# Patient Record
(demographics unavailable — no encounter records)

---

## 2024-10-10 NOTE — HISTORY OF PRESENT ILLNESS
[FreeTextEntry1] : Mr. ALYSON QURESHI is a 60-year male who presents today for followup. Previously, he has been followed for right side lung cancer s/p SBRT, wedge resection of KIMBERLY lesion fungal abscess, right side empyema with Eloesser flap, osteomyelitis of right right, chronic rib pain with chronic opioid dependence, COPD, emphysema with bullous upper lobe disease and a bronchopleural fistula who underwent endobronchial valve placement to RML, removal of 6th rib and Eloesser flap revision for persistent air leak and right empyema on 4/18/2023.  He is currently under Palliative Care and receiving Dilaudid PCA for his ongoing pain.  9/8/24 - 9/11/24: hospitalized at [facility]. Presented with worsening shortness of breath. In the ED. Placed on BiPAP, treated with nebulizers, magnesium, steroids, and empiric antibiotics. CXR without evidence of pneumonia. ABG with CO2 retention. Admitted hypoxic and hypercapnic respiratory failure in the setting of COPD exacerbation.

## 2024-10-14 NOTE — ASSESSMENT
[FreeTextEntry1] : Today a depression screening was completed (PHQ-2) and the patient screened positive. This does not diagnose depression but may detect signs of depression. The patient denies suicidal ideation. Brief discussion held with the patient regarding options for their next steps. This is not something we can diagnose or manage in our office, but a behavioral health referral was offered and he refused. The patient was strongly encouraged to follow up with primary care provider as soon as possible. The patient expressed understanding and compliance.  Jose is a 59-year-old male with an open chest and persistent air leak.  His wound is clean.  He was recently hospitalized with a COPD exacerbation and has improved.  I would like to see him in 1 month's time for follow-up.  Thank you for allowing me to participate in the care of your patient.  30 minutes was spent during this encounter.  Dylan Mcgregor MD Department of Cardiovascular and Thoracic Surgery  [employer] at [facility]

## 2024-10-14 NOTE — HISTORY OF PRESENT ILLNESS
Outpatient Therapy Discharge Summary     Name: Jayro Mattson  [facility] Number: [id number]    Therapy Diagnosis:   Encounter Diagnoses   Name Primary?    Balance problem     Weakness of both lower extremities      Physician: Han Monroy MD     Physician Orders: PT Eval and Treat   Medical Diagnosis from Referral: dizziness and giddiness  Evaluation Date: 8/31/2018      Date of Last visit: 09/27/2018  Total Visits Received: 8    Assessment    Goals:   Short Term Goals: 4 weeks   Pt will have less sway with standing to improve steadiness with standing and gait. MET  Pt to improve Murphy Balance score by 3 percentage points. MET  Pt able to balance single leg with eyes closed for 10 seconds. MET   Long Term Goals: 8 weeks   Pt able to balance single leg with eyes closed for 20 seconds MET  5/5 strength bilateral LEs for improved steadiness with standing. MET  Pt improves Murphy balance score by 5 percentage points.MET      Discharge reason: Patient has met all of his/her goals    Plan   This patient is discharged from Physical Therapy       [FreeTextEntry1] : Mr. ALYSON QURESHI is a 59-year male who presents today for followup. Previously, he has been followed for right side lung cancer s/p SBRT, wedge resection of KIMBERLY lesion fungal abscess, right side empyema with Eloesser flap, osteomyelitis of right right, chronic rib pain with chronic opioid dependence, COPD, emphysema with bullous upper lobe disease and a bronchopleural fistula who underwent endobronchial valve placement to RML, removal of 6th rib and Eloesser flap revision for persistent air leak and right empyema on 4/18/2023.  He is currently under Palliative Care and receiving Dilaudid PCA for his ongoing pain.   Today he reports that he is feeling "great."

## 2024-10-14 NOTE — REVIEW OF SYSTEMS
[Arthralgias] : arthralgias [As Noted in HPI] : as noted in HPI [Negative] : Musculoskeletal [Fever] : no fever [Chills] : no chills [Feeling Poorly] : not feeling poorly [Feeling Tired] : not feeling tired [Chest Pain] : no chest pain [Palpitations] : no palpitations [Lower Ext Edema] : no extremity edema [Shortness Of Breath] : no shortness of breath [Wheezing] : no wheezing [Cough] : no cough [SOB on Exertion] : no shortness of breath during exertion [FreeTextEntry2] : Left arm PICC

## 2024-10-14 NOTE — DATA REVIEWED
[FreeTextEntry1] : 8/23/24 CT chest performed through [facility] IMPRESSION: Postsurgical change to right chest wall, left upper lobe and right lung  base, with fibrotic change and scarring. Increased left apical nodular  opacity, up to 1.5 cm. Follow-up CT chest in 3 months recommended.

## 2024-10-14 NOTE — HISTORY OF PRESENT ILLNESS
[FreeTextEntry1] : Mr. ALYSON QURESHI is a 59-year male who presents today for followup. Previously, he has been followed for right side lung cancer s/p SBRT, wedge resection of KIMBERLY lesion fungal abscess, right side empyema with Eloesser flap, osteomyelitis of right right, chronic rib pain with chronic opioid dependence, COPD, emphysema with bullous upper lobe disease and a bronchopleural fistula who underwent endobronchial valve placement to RML, removal of 6th rib and Eloesser flap revision for persistent air leak and right empyema on 4/18/2023.  He is currently under Palliative Care and receiving Dilaudid PCA for his ongoing pain.   Today he reports that he is feeling "great."

## 2024-10-14 NOTE — DATA REVIEWED
[FreeTextEntry1] : 8/23/24 CT chest performed through [facility] Imaging IMPRESSION: Postsurgical change to right chest wall, left upper lobe and right lung  base, with fibrotic change and scarring. Increased left apical nodular  opacity, up to 1.5 cm. Follow-up CT chest in 3 months recommended.

## 2024-10-14 NOTE — ASSESSMENT
[FreeTextEntry1] : Today a depression screening was completed (PHQ-2) and the patient screened positive. This does not diagnose depression but may detect signs of depression. The patient denies suicidal ideation. Brief discussion held with the patient regarding options for their next steps. This is not something we can diagnose or manage in our office, but a behavioral health referral was offered and he refused. The patient was strongly encouraged to follow up with primary care provider as soon as possible. The patient expressed understanding and compliance.  Jose is a 59-year-old male with an open chest and persistent air leak.  His wound is clean.  He was recently hospitalized with a COPD exacerbation and has improved.  I would like to see him in 1 month's time for follow-up.  Thank you for allowing me to participate in the care of your patient.  30 minutes was spent during this encounter.  Dylan Mcgregor MD Department of Cardiovascular and Thoracic Surgery  [employer] at [facility]

## 2024-11-04 NOTE — DATA REVIEWED
[No studies available for review at this time.] : No studies available for review at this time. [FreeTextEntry1] : 8/23/24 CT chest performed through [facility] IMPRESSION: Postsurgical change to right chest wall, left upper lobe and right lung  base, with fibrotic change and scarring. Increased left apical nodular  opacity, up to 1.5 cm. Follow-up CT chest in 3 months recommended.

## 2024-11-04 NOTE — REVIEW OF SYSTEMS
[Negative] : Heme/Lymph [Fever] : no fever [Chills] : no chills [Feeling Poorly] : not feeling poorly [Feeling Tired] : not feeling tired [Chest Pain] : no chest pain [Palpitations] : no palpitations [Lower Ext Edema] : no extremity edema [As Noted in HPI] : as noted in HPI [Shortness Of Breath] : no shortness of breath [Wheezing] : no wheezing [Cough] : no cough [SOB on Exertion] : no shortness of breath during exertion [FreeTextEntry2] : Left arm PICC

## 2024-11-04 NOTE — ASSESSMENT
[FreeTextEntry1] : Today a depression screening was completed (PHQ-2) and the patient screened positive. This does not diagnose depression but may detect signs of depression. The patient denies suicidal ideation. Brief discussion held with the patient regarding options for their next steps. This is not something we can diagnose or manage in our office, but a behavioral health referral was offered and he refused. The patient was strongly encouraged to follow up with primary care provider as soon as possible. The patient expressed understanding and compliance.  Jose is a 59-year-old male with an open chest and persistent air leak.  His wound is clean.  I would like to see him in 1 month's time for follow-up.  Thank you for allowing me to participate in the care of your patient.  30 minutes was spent during this encounter.  Dylan Mcgregor MD Department of Cardiovascular and Thoracic Surgery  [employer] at [facility]

## 2024-11-04 NOTE — DATA REVIEWED
[No studies available for review at this time.] : No studies available for review at this time. [FreeTextEntry1] : 8/23/24 CT chest performed through [facility] Imaging IMPRESSION: Postsurgical change to right chest wall, left upper lobe and right lung  base, with fibrotic change and scarring. Increased left apical nodular  opacity, up to 1.5 cm. Follow-up CT chest in 3 months recommended.

## 2024-11-04 NOTE — ASSESSMENT
[FreeTextEntry1] : Today a depression screening was completed (PHQ-2) and the patient screened positive. This does not diagnose depression but may detect signs of depression. The patient denies suicidal ideation. Brief discussion held with the patient regarding options for their next steps. This is not something we can diagnose or manage in our office, but a behavioral health referral was offered and he refused. The patient was strongly encouraged to follow up with primary care provider as soon as possible. The patient expressed understanding and compliance.  Jose is a 59-year-old male with an open chest and persistent air leak.  His wound is clean.  I would like to see him in 1 month's time for follow-up.  Thank you for allowing me to participate in the care of your patient.  30 minutes was spent during this encounter.  Dylan Mcgregor MD Department of Cardiovascular and Thoracic Surgery  [employer] at [facility]

## 2024-12-09 NOTE — DATA REVIEWED
[No studies available for review at this time.] : No studies available for review at this time. [FreeTextEntry1] : 8/23/24 CT chest performed through [facility] IMPRESSION: Postsurgical change to right chest wall, left upper lobe and right lung  base, with fibrotic change and scarring. Increased left apical nodular  opacity, up to 1.5 cm. Follow-up CT chest in 3 months recommended.

## 2024-12-09 NOTE — REVIEW OF SYSTEMS
[Fever] : no fever [Chills] : no chills [Feeling Poorly] : not feeling poorly [Feeling Tired] : not feeling tired [Chest Pain] : no chest pain [Palpitations] : no palpitations [Lower Ext Edema] : no extremity edema [Shortness Of Breath] : no shortness of breath [Wheezing] : no wheezing [Cough] : no cough [SOB on Exertion] : no shortness of breath during exertion [As Noted in HPI] : as noted in HPI [Negative] : Heme/Lymph [FreeTextEntry2] : Left arm PICC

## 2024-12-09 NOTE — ASSESSMENT
[FreeTextEntry1] : Today a depression screening was completed (PHQ-2) and the patient screened positive. This does not diagnose depression but may detect signs of depression. The patient denies suicidal ideation. Brief discussion held with the patient regarding options for their next steps. This is not something we can diagnose or manage in our office, but a behavioral health referral was offered and he refused. The patient was strongly encouraged to follow up with primary care provider as soon as possible. The patient expressed understanding and compliance.  Jose is a 59-year-old male with an open chest and persistent air leak.  His wound is clean.  He does complain of more pain in the chest and shoulder today and will be seeing palliative in the near future. I would like to see him in 1 month's time for follow-up.  Thank you for allowing me to participate in the care of your patient.  30 minutes was spent during this encounter.  Dylan Mcgregor MD Department of Cardiovascular and Thoracic Surgery  [employer] at [facility]

## 2024-12-09 NOTE — DATA REVIEWED
[No studies available for review at this time.] : No studies available for review at this time. [FreeTextEntry1] : 8/23/24 CT chest performed through [facility] IMPRESSION: Postsurgical change to right chest wall, left upper lobe and right lung  base, with fibrotic change and scarring. Increased left apical nodular  opacity, up to 1.5 cm. Follow-up CT chest in 3 months recommended.

## 2024-12-09 NOTE — ASSESSMENT
[FreeTextEntry1] : Today a depression screening was completed (PHQ-2) and the patient screened positive. This does not diagnose depression but may detect signs of depression. The patient denies suicidal ideation. Brief discussion held with the patient regarding options for their next steps. This is not something we can diagnose or manage in our office, but a behavioral health referral was offered and he refused. The patient was strongly encouraged to follow up with primary care provider as soon as possible. The patient expressed understanding and compliance.  Jose is a 59-year-old male with an open chest and persistent air leak.  His wound is clean.  He does complain of more pain in the chest and shoulder today and will be seeing palliative in the near future. I would like to see him in 1 month's time for follow-up.  Thank you for allowing me to participate in the care of your patient.  30 minutes was spent during this encounter.  Dylan Mcgregor MD Department of Cardiovascular and Thoracic Surgery  [employer] at [facility]

## 2024-12-18 NOTE — HISTORY OF PRESENT ILLNESS
[FreeTextEntry1] : Pt is a 59 y/o M COPD with RUL NSCLC S/p CyberKnife 6000 cGy in 4 fractions to RUL 12/12/16 - 12/15/16 stage IB poorly differentiated carcinoma. He also had a KIMBERLY wedge resection, on at site of surgery, right side empyema with Eloesser flap, osteomyelitis s/p multiple IV Abx regimens, chronic rib pain with chronic opioid dependence, COPD, emphysema with bullous upper lobe disease and a bronchopleural fistula who underwent endobronchial valve placement to RML, removal of 6th rib and Eloesser flap revision for persistent air leak and right empyema on 4/18/2023, Aspergilloma s/p course of voriconazole comes for f/u for pain management.   Interval Events:  Admitted to [facility] 11/8-11/9 for RLL pneumonia on CPAP + received course of abx for chronic wound  1. Chronic pain - 6-7/10 on current morphine regimen = mod/ severe pain. Goal is 5/10  - Stable on dilaudid BT medication   2. Anxiety  - PCP started patient on Lexapro 10 mg which is making the patient a "zombie" - pt considering DC of Lexapro, however seems less anxious   3. COPD - pulm following -stable on inhalers  4. Debility  -60 %, able to walk dogs, go outside - can not lift, mainly sit/lie

## 2024-12-18 NOTE — REASON FOR VISIT
[Spouse] : spouse [Friend] : friend [Home] : at home, [unfilled] , at the time of the visit. [Medical Office: ([facility])___] : at the medical office located in  [Patient] : the patient

## 2024-12-18 NOTE — REVIEW OF SYSTEMS
[Fever] : no fever [Chills] : no chills [Shortness Of Breath] : no shortness of breath [Wheezing] : no wheezing [Cough] : no cough [SOB on Exertion] : shortness of breath during exertion [Abdominal Pain] : no abdominal pain [Constipation] : no constipation [Diarrhea] : no diarrhea [Dysuria] : no dysuria [Joint Stiffness] : joint stiffness [Confused] : no confusion [Dizziness] : no dizziness [Fainting] : no fainting [Anxiety] : anxiety [Depression] : no depression [Negative] : Cardiovascular

## 2024-12-18 NOTE — PHYSICAL EXAM
[General Appearance - Alert] : alert [Sclera] : the sclera and conjunctiva were normal [Extraocular Movements] : extraocular movements were intact [No Oral Pallor] : no oral pallor [] : no respiratory distress [Edema] : there was no peripheral edema [Involuntary Movements] : no involuntary movements were seen [FreeTextEntry1] : Missing ribs on palpation, abnl spinal curvature. [Impaired Insight] : insight and judgment were intact [Affect] : the affect was normal [Mood] : the mood was normal

## 2024-12-18 NOTE — END OF VISIT
[] : Fellow [FreeTextEntry3] : Agree with the [place] assessment and plan.  I have reviewed and edited the note above as needed.  Patient is a 60 y/o patient with chronic progressive pain.  Patient is reported bad taste and nausea w/ methadone soln- will change to po.  Will consider antidepressant to help with anxiety, however must avoid serotonin agents.  C/w medical marijuana for anxiety and xanax for panic attacks C/w medication regimen as outlined above Recc to make apt with pain management for consideration of spinal stimulator vs other alt modality  [Time Spent: ___ minutes] : I have spent [unfilled] minutes of time on the encounter which excludes teaching and separately reported services.

## 2024-12-19 NOTE — RESULTS/DATA
[TextEntry] : 12/19/2024: Spirometry demonstrates a forced vital capacity of 2.84 L (71%) and an FEV1 of 1.15 L (37%).  Compared to previous studies of February 2024 there is been improvement in forced vital capacity from 2.63 L and FEV1 from 1.03 L

## 2024-12-19 NOTE — CONSULT LETTER
[Dear  ___] : Dear  [unfilled], [Consult Letter:] : I had the pleasure of evaluating your patient, [unfilled]. [Please see my note below.] : Please see my note below. [Consult Closing:] : Thank you very much for allowing me to participate in the care of this patient.  If you have any questions, please do not hesitate to contact me. [Sincerely,] : Sincerely, [FreeTextEntry3] : Philip Otoole MD FCCP Pulmonary/Critical Care/Sleep Medicine Department of Internal Medicine  [facility]

## 2024-12-19 NOTE — HISTORY OF PRESENT ILLNESS
[TextBox_4] : 60-year-old male with a history of stage IV chronic obstructive lung disease status post SBRT with wedge resection of left upper lobe mass (fungal abscess), right sided empyema, Ellisor flap with revision, bronchopleural fistula, right middle lobe lobectomy with removal of the 6 rib and chronic pain syndrome with chronic opioid dependency, chronic hypercapnic respiratory failure on noninvasive ventilation seen today in follow-up ([facility] surgical).  Patient doing well with continued complaint of right-sided chest pain.  Recent CAT scan was performed.  No increased complaints of cough wheeze or sputum production

## 2024-12-19 NOTE — PHYSICAL EXAM
[No Acute Distress] : no acute distress [Normal Oropharynx] : normal oropharynx [Normal Appearance] : normal appearance [No Neck Mass] : no neck mass [Normal Rate/Rhythm] : normal rate/rhythm [Normal S1, S2] : normal s1, s2 [No Murmurs] : no murmurs [No Resp Distress] : no resp distress [Clear to Auscultation Bilaterally] : clear to auscultation bilaterally [Benign] : benign [Normal Gait] : normal gait [No Clubbing] : no clubbing [No Cyanosis] : no cyanosis [No Edema] : no edema [FROM] : FROM [Normal Color/ Pigmentation] : normal color/ pigmentation [No Focal Deficits] : no focal deficits [Oriented x3] : oriented x3 [Normal Affect] : normal affect [TextBox_2] : Thin cachectic male [TextBox_80] : Left posterior chest wall defect with minimal air on exhalation

## 2024-12-19 NOTE — END OF VISIT
[Time Spent: ___ minutes] : I have spent [unfilled] minutes of time on the encounter which excludes teaching and separately reported services. [FreeTextEntry3] : Hospitalization review, PACS review

## 2024-12-19 NOTE — DISCUSSION/SUMMARY
[FreeTextEntry1] : 60-year-old male with a history of stage IV chronic obstructive lung disease complicated by the above seen today in follow-up.  Presently the patient is COPD appears to be at his best.  He is doing clinically well with his noninvasive ventilator.  He has been advised to continue therapy.  A follow-up CAT scan will be performed in 3 months secondary to a relatively new left lower lobe nodule

## 2025-01-04 NOTE — HISTORY OF PRESENT ILLNESS
[FreeTextEntry1] : 61 YO MALE PMH OF R SIDED LUNG CA  S/P RADIATION THERAPY WEDGE RESECTION OF KIMBERLY LESION? FUNGAL ABSCESS ELOESSER FLAP  OSTEOMYELITS OF RIGHT RIB WITH CHRINIC OPOID DEPENDECE PT WAS RECENTLY HOSPITALIZED AT [facility] FOR INCREASED  SOB ADN PT HAD POSTIVE FLU SWAB AND WAS DISCHARGED  ON  TAMIFUL HERE FOR FOLLUWP    FEELS WEAK

## 2025-01-04 NOTE — ASSESSMENT
[FreeTextEntry1] : 61 YO MALE PMH OF R SIDED LUNG CA  S/P RADIATION THERAPY WEDGE RESECTION OF KIMBERLY LESION? FUNGAL ABSCESS ELOESSER FLAP  OSTEOMYELITS OF RIGHT RIB WITH CHRINIC OPOID DEPENDECE PT WAS RECENTLY HOSPITALIZED AT [facility] FOR INCREASED  SOB ADN PT HAD POSTIVE FLU SWAB AND WAS DISCHARGED  ON  TAMIFUL HERE FOR FOLLUWP    FEELS WEAK  BUT BETTER THAN IN THE HOSPITAL JEFFRY PLAN TO COMPLETED THE TAMIFUL  FOLLOWP WITH PULM PT WITH  LARGE BADAGE ON UPPER BACK   DID NOT REMOVE AS NEED TO BE PACKED  FOLLOWUP WITH CT SURGERY PT IS SCHEDULED FOR REPAT CT SCAN OF CHEST  WILL JULISSA PULM AFTER SCAN  REVIEWD HOSP RECORDS FORM RECENT HSOPITAL STAY WITH INFLUENZA

## 2025-01-04 NOTE — HISTORY OF PRESENT ILLNESS
[FreeTextEntry1] : 59 YO MALE PMH OF R SIDED LUNG CA  S/P RADIATION THERAPY WEDGE RESECTION OF KIMBERLY LESION? FUNGAL ABSCESS ELOESSER FLAP  OSTEOMYELITS OF RIGHT RIB WITH CHRINIC OPOID DEPENDECE PT WAS RECENTLY HOSPITALIZED AT [facility] FOR INCREASED  SOB ADN PT HAD POSTIVE FLU SWAB AND WAS DISCHARGED  ON  TAMIFUL HERE FOR FOLLUWP    FEELS WEAK

## 2025-01-04 NOTE — ASSESSMENT
[FreeTextEntry1] : 59 YO MALE PMH OF R SIDED LUNG CA  S/P RADIATION THERAPY WEDGE RESECTION OF KIMBERLY LESION? FUNGAL ABSCESS ELOESSER FLAP  OSTEOMYELITS OF RIGHT RIB WITH CHRINIC OPOID DEPENDECE PT WAS RECENTLY HOSPITALIZED AT [facility] FOR INCREASED  SOB ADN PT HAD POSTIVE FLU SWAB AND WAS DISCHARGED  ON  TAMIFUL HERE FOR FOLLUWP    FEELS WEAK  BUT BETTER THAN IN THE HOSPITAL JEFFRY PLAN TO COMPLETED THE TAMIFUL  FOLLOWP WITH PULM PT WITH  LARGE BADAGE ON UPPER BACK   DID NOT REMOVE AS NEED TO BE PACKED  FOLLOWUP WITH CT SURGERY PT IS SCHEDULED FOR REPAT CT SCAN OF CHEST  WILL JULISSA PULM AFTER SCAN  REVIEWD HOSP RECORDS FORM RECENT HSOPITAL STAY WITH INFLUENZA

## 2025-01-13 NOTE — ASSESSMENT
[FreeTextEntry1] : Jose is a 60-year-old male with a thoracic window and a persistent air leak who was recently hospitalized with the flu.  He states he still has fatigue but is improving on a daily basis.  I would like to see him in 1 month's time.  Thank you for allowing me to participate in the care of your patient.  30 minutes was spent during this encounter.  Dylan Mcgregor MD Department of Cardiovascular and Thoracic Surgery  [employer] at [facility]

## 2025-01-13 NOTE — ASSESSMENT
[FreeTextEntry1] : Jose is a 60-year-old male with a thoracic window and a persistent air leak who was recently hospitalized with the flu.  He states he still has fatigue but is improving on a daily basis.  I would like to see him in 1 month's time.  Thank you for allowing me to participate in the care of your patient.  30 minutes was spent during this encounter.  Dylan Mcgregor MD Department of Cardiovascular and Thoracic Surgery  [employer] at [facility]

## 2025-01-13 NOTE — DATA REVIEWED
[FreeTextEntry1] : Independent review of imaging and independent interpretation was performed at today's visit. -11/25/24 CT Chest non contrast through [facility]

## 2025-01-13 NOTE — HISTORY OF PRESENT ILLNESS
[FreeTextEntry1] : Mr. ALYSON QURESHI is a 60-year-old male who presents today for followup. Previously, he has been followed for right side lung cancer s/p SBRT, wedge resection of KIMBERLY lesion fungal abscess, right side empyema with Eloesser flap, osteomyelitis of right right, chronic rib pain with chronic opioid dependence, COPD, emphysema with bullous upper lobe disease and a bronchopleural fistula who underwent endobronchial valve placement to RML, removal of 6th rib and Eloesser flap revision for persistent air leak and right empyema on 4/18/2023. He presents today for clinical follow up.   He continues to be under Palliative Care and receiving Dilaudid PCA for his ongoing pain.  He is being followed by Pulmonology. Dr. Otoole.   11/25/24 CT Chest non contrast through [facility] -Indeterminate left apical 0.6 x 1 cm nodular opacity is unchanged compared to 8/23/2024 CT chest but slightly progressed compared to 10/14/2023. -Indeterminate left lower lobe 1 cm nodular consolidative opacity is unchanged compared to 8/23/2024 but appears more dense compared to 10/14/2023.  Today he reports having recently been admitted for Influenza A with sepsis and hypoxemia. He was discharged 1/1/2025. He currently feels  aosft 7/10 pain in the chest. He feels beaten from the flu. He is feeling a little better

## 2025-01-13 NOTE — DATA REVIEWED
[FreeTextEntry1] : Independent review of imaging and independent interpretation was performed at today's visit. -11/25/24 CT Chest non contrast through [facility]

## 2025-01-13 NOTE — HISTORY OF PRESENT ILLNESS
[FreeTextEntry1] : Mr. ALYSON QURESHI is a 60-year-old male who presents today for followup. Previously, he has been followed for right side lung cancer s/p SBRT, wedge resection of KIMBERLY lesion fungal abscess, right side empyema with Eloesser flap, osteomyelitis of right right, chronic rib pain with chronic opioid dependence, COPD, emphysema with bullous upper lobe disease and a bronchopleural fistula who underwent endobronchial valve placement to RML, removal of 6th rib and Eloesser flap revision for persistent air leak and right empyema on 4/18/2023. He presents today for clinical follow up.   He continues to be under Palliative Care and receiving Dilaudid PCA for his ongoing pain.  He is being followed by Pulmonology. Dr. Otoole.   11/25/24 CT Chest non contrast through [facility] -Indeterminate left apical 0.6 x 1 cm nodular opacity is unchanged compared to 8/23/2024 CT chest but slightly progressed compared to 10/14/2023. -Indeterminate left lower lobe 1 cm nodular consolidative opacity is unchanged compared to 8/23/2024 but appears more dense compared to 10/14/2023.  Today he reports having recently been admitted for Influenza A with sepsis and hypoxemia. He was discharged 1/1/2025. He currently feels  aosft 7/10 pain in the chest. He feels beaten from the flu. He is feeling a little better

## 2025-01-24 NOTE — REASON FOR VISIT
[Family Member] : family member [Home] : at home, [unfilled] , at the time of the visit. [Medical Office: ([facility])___] : at the medical office located in  [Spouse] : spouse [Patient] : the patient [FreeTextEntry4] : Wife Terrie

## 2025-01-24 NOTE — REASON FOR VISIT
[Family Member] : family member [Home] : at home, [unfilled] , at the time of the visit. [Medical Office: ([facility])___] : at the medical office located in  [Spouse] : spouse [Patient] : the patient [FreeTextEntry4] : Wife Terrie

## 2025-01-24 NOTE — HISTORY OF PRESENT ILLNESS
[FreeTextEntry1] : Patient seen via telemedicine today, video and audio. Patient accompanied by wife Terrie Pt is a 59 y/o M COPD with RUL NSCLC S/p CyberKnife 6000 cGy in 4 fractions to RUL 12/12/16 - 12/15/16 stage IB poorly differentiated carcinoma. He also had a KIMBERLY wedge resection, on at site of surgery, right side empyema with Eloesser flap, osteomyelitis s/p multiple IV Abx regimens, chronic rib pain with chronic opioid dependence, COPD, emphysema with bullous upper lobe disease and a bronchopleural fistula who underwent endobronchial valve placement to RML, removal of 6th rib and Eloesser flap revision for persistent air leak and right empyema on 4/18/2023, Aspergilloma s/p course of voriconazole comes for f/u for pain management.   1. Chronic pain- worsening, evolving, concern for tolerance Dilaudid PCA via PICC line: CR 10mg/hr, DD 10mg with LO 15minutes Pt was given news yesterday that he would not be receiving anymore Dilaudid IV medication at home because of Dilaudid shortage. Patient reports that he has not been using his demand doses in order to conserve medication to try to make it last. However he has been having untolerable pain, 10/10, located in R chest and wound on back, sharp in nature. He reports that he now sees how much the demand doses have helped him, would make the pain tolerable at 6/10. Last bowel movement was today 1/15/25.

## 2025-01-24 NOTE — REVIEW OF SYSTEMS
[SOB on Exertion] : shortness of breath during exertion [Joint Stiffness] : joint stiffness [Anxiety] : anxiety [Negative] : Cardiovascular [Fever] : no fever [Chills] : no chills [Shortness Of Breath] : no shortness of breath [Wheezing] : no wheezing [Cough] : no cough [Abdominal Pain] : no abdominal pain [Constipation] : no constipation [Diarrhea] : no diarrhea [Dysuria] : no dysuria [Confused] : no confusion [Dizziness] : no dizziness [Fainting] : no fainting [Depression] : no depression

## 2025-01-24 NOTE — END OF VISIT
[Time Spent: ___ minutes] : I have spent [unfilled] minutes of time on the encounter which excludes teaching and separately reported services. [] : Fellow [FreeTextEntry3] : Signout to be given to inpatient pal team

## 2025-01-24 NOTE — HISTORY OF PRESENT ILLNESS
[FreeTextEntry1] : Patient seen via telemedicine today, video and audio. Patient accompanied by wife Terrie Pt is a 61 y/o M COPD with RUL NSCLC S/p CyberKnife 6000 cGy in 4 fractions to RUL 12/12/16 - 12/15/16 stage IB poorly differentiated carcinoma. He also had a KIMBERLY wedge resection, on at site of surgery, right side empyema with Eloesser flap, osteomyelitis s/p multiple IV Abx regimens, chronic rib pain with chronic opioid dependence, COPD, emphysema with bullous upper lobe disease and a bronchopleural fistula who underwent endobronchial valve placement to RML, removal of 6th rib and Eloesser flap revision for persistent air leak and right empyema on 4/18/2023, Aspergilloma s/p course of voriconazole comes for f/u for pain management.   1. Chronic pain- worsening, evolving, concern for tolerance Dilaudid PCA via PICC line: CR 10mg/hr, DD 10mg with LO 15minutes Pt was given news yesterday that he would not be receiving anymore Dilaudid IV medication at home because of Dilaudid shortage. Patient reports that he has not been using his demand doses in order to conserve medication to try to make it last. However he has been having untolerable pain, 10/10, located in R chest and wound on back, sharp in nature. He reports that he now sees how much the demand doses have helped him, would make the pain tolerable at 6/10. Last bowel movement was today 1/15/25.

## 2025-02-05 NOTE — PHYSICAL EXAM
[General Appearance - Alert] : alert [General Appearance - In No Acute Distress] : in no acute distress [Sclera] : the sclera and conjunctiva were normal [Extraocular Movements] : extraocular movements were intact [No Oral Pallor] : no oral pallor [Neck Appearance] : the appearance of the neck was normal [Exaggerated Use Of Accessory Muscles For Inspiration] : no accessory muscle use [Heart Sounds] : normal S1 and S2 [Edema] : there was no peripheral edema [Bowel Sounds] : normal bowel sounds [Involuntary Movements] : no involuntary movements were seen [] : no rash [No Focal Deficits] : no focal deficits [Affect] : the affect was normal [Mood] : the mood was normal [FreeTextEntry1] : Missing ribs on palpation, abnl spinal curvature.

## 2025-02-05 NOTE — REVIEW OF SYSTEMS
[SOB on Exertion] : shortness of breath during exertion [Joint Stiffness] : joint stiffness [Anxiety] : anxiety [Skin Wound] : skin wound [As Noted in HPI] : as noted in HPI [Negative] : Heme/Lymph [Fever] : no fever [Chills] : no chills [Shortness Of Breath] : no shortness of breath [Wheezing] : no wheezing [Cough] : no cough [Abdominal Pain] : no abdominal pain [Constipation] : no constipation [Diarrhea] : no diarrhea [Dysuria] : no dysuria [Confused] : no confusion [Dizziness] : no dizziness [Fainting] : no fainting [Depression] : no depression

## 2025-02-05 NOTE — REASON FOR VISIT
[Family Member] : family member [Home] : at home, [unfilled] , at the time of the visit. [Medical Office: ([facility])___] : at the medical office located in  [Spouse] : spouse [Patient] : the patient [FreeTextEntry4] : Wife Terrie

## 2025-02-05 NOTE — HISTORY OF PRESENT ILLNESS
[FreeTextEntry1] : 61 y/o M COPD with RUL NSCLC S/p CyberKnife 6000 cGy in 4 fractions to RUL 12/12/16 - 12/15/16 stage IB poorly differentiated carcinoma. He also had a KIMBERLY wedge resection, on at site of surgery, right side empyema with Eloesser flap, osteomyelitis s/p multiple IV Abx regimens, chronic rib pain with chronic opioid dependence, COPD, emphysema with bullous upper lobe disease and a bronchopleural fistula who underwent endobronchial valve placement to RML, removal of 6th rib and Eloesser flap revision for persistent air leak and right empyema on 4/18/2023, Aspergilloma s/p course of voriconazole comes for f/u for pain management.   Post Hospitalization Discharge from [facility] 1/22. Post Hospitalization Day 15 for Opioid rotation to fentanyl PCA secondary to Dilaudid shortage. Accompanied by wife Terrie  # Chronic pain syndrome - Controlled History of involving pain as well as tolerance/opioid dependence complicated by existential pain Patient has failed sertraline and lexapro for treatment of depression and anxiety Though fentanyl can wear off quicker, patient is overall stable He was discharged on fentanyl gtt at 7 mcg/kg/h Demand dose 450 mcg, lockout 30 minutes, clinician rescue bolus was 600 mcg in 4-hour limit:'s 3600 mcg  #Anxiety On Xanax 1 mg 3 times dailya s needed  # Advanced COPD Has oxygen concentrator as needed Pulmonary following closely for inhaler optimization Recently recovered from influenza  #Debility 60%, able to walk dogs, goes outside for doctors appointments and shopping with wife's assistance.  Cannot lift, mainly sit/lie

## 2025-02-07 NOTE — REASON FOR VISIT
[Family Member] : family member [Home] : at home, [unfilled] , at the time of the visit. [Medical Office: ([facility])___] : at the medical office located in  [Spouse] : spouse [Patient] : the patient [FreeTextEntry4] : Wife Terrie

## 2025-02-07 NOTE — REVIEW OF SYSTEMS
Possible Home/Possible Skilled Nursing Facilty (SNF) [SOB on Exertion] : shortness of breath during exertion [Joint Stiffness] : joint stiffness [Anxiety] : anxiety [Negative] : Cardiovascular [Fever] : no fever [Chills] : no chills [Shortness Of Breath] : no shortness of breath [Wheezing] : no wheezing [Cough] : no cough [Abdominal Pain] : no abdominal pain [Constipation] : no constipation [Diarrhea] : no diarrhea [Dysuria] : no dysuria [Confused] : no confusion [Dizziness] : no dizziness [Fainting] : no fainting [Depression] : no depression

## 2025-02-07 NOTE — REASON FOR VISIT
[Family Member] : family member [Home] : at home, [unfilled] , at the time of the visit. [Medical Office: ([facility])___] : at the medical office located in  [Spouse] : spouse [Patient] : the patient [FreeTextEntry4] : Wife Terrie

## 2025-02-07 NOTE — REASON FOR VISIT
[Family Member] : family member [Home] : at home, [unfilled] , at the time of the visit. [Medical Office: ([facility])___] : at the medical office located in  [Spouse] : spouse [Patient] : the patient [FreeTextEntry4] : Wife Terrie

## 2025-02-07 NOTE — END OF VISIT
[] : Fellow [Time Spent: ___ minutes] : I have spent [unfilled] minutes of time on the encounter which excludes teaching and separately reported services. [FreeTextEntry3] : Signout to be given to inpatient pal team

## 2025-02-07 NOTE — REASON FOR VISIT
[Family Member] : family member [Home] : at home, [unfilled] , at the time of the visit. [Medical Office: ([facility])___] : at the medical office located in  [Spouse] : spouse [Patient] : the patient [FreeTextEntry4] : Wife Terrie

## 2025-02-10 NOTE — ASSESSMENT
[FreeTextEntry1] : Jose is a 60-year-old male with a thoracic window and a persistent air leak.  He states he still has fatigue but is improving on a daily basis.  I would like to see him in 1 month's time.  Thank you for allowing me to participate in the care of your patient.  30 minutes was spent during this encounter.  Dylan Mcgregor MD Department of Cardiovascular and Thoracic Surgery  [employer] at [facility]

## 2025-02-10 NOTE — HISTORY OF PRESENT ILLNESS
[FreeTextEntry1] : Mr. ALYSON QURESHI is a 60-year-old male who presents today for followup. Previously, he has been followed for right side lung cancer s/p SBRT, wedge resection of KIMBERLY lesion fungal abscess, right side empyema with Eloesser flap, osteomyelitis of right right, chronic rib pain with chronic opioid dependence, COPD, emphysema with bullous upper lobe disease and a bronchopleural fistula who underwent endobronchial valve placement to RML, removal of 6th rib and Eloesser flap revision for persistent air leak and right empyema on 4/18/2023. He presents today for clinical follow up.   He continues to be under Palliative Care and receiving Dilaudid PCA for his ongoing pain.  He is being followed by Pulmonology. Dr. Otoole.   11/25/24 CT Chest non contrast through [facility] -Indeterminate left apical 0.6 x 1 cm nodular opacity is unchanged compared to 8/23/2024 CT chest but slightly progressed compared to 10/14/2023. -Indeterminate left lower lobe 1 cm nodular consolidative opacity is unchanged compared to 8/23/2024 but appears more dense compared to 10/14/2023.  Today he reports having recently been admitted for Influenza A with sepsis and hypoxemia. He was discharged 1/1/2025. He currently feels  aosft 7/10 pain in the chest. He feels beaten from the flu. He is feeling a little better

## 2025-02-10 NOTE — ASSESSMENT
[FreeTextEntry1] : Jose is a 60-year-old male with a thoracic window and a persistent air leak.  He states he still has fatigue but is improving on a daily basis.  I would like to see him in 1 month's time.  Thank you for allowing me to participate in the care of your patient.  30 minutes was spent during this encounter.  Dylan Mcgregor MD Department of Cardiovascular and Thoracic Surgery  [employer] at [facility]

## 2025-02-10 NOTE — DATA REVIEWED
[FreeTextEntry1] : Independent review of imaging and independent interpretation was performed at today's visit. -11/25/24 CT Chest non contrast through [facility]

## 2025-02-10 NOTE — HISTORY OF PRESENT ILLNESS
[FreeTextEntry1] : Mr. ALYSON QURESHI is a 60-year-old male who presents today for followup. Previously, he has been followed for right side lung cancer s/p SBRT, wedge resection of KIMBERLY lesion fungal abscess, right side empyema with Eloesser flap, osteomyelitis of right right, chronic rib pain with chronic opioid dependence, COPD, emphysema with bullous upper lobe disease and a bronchopleural fistula who underwent endobronchial valve placement to RML, removal of 6th rib and Eloesser flap revision for persistent air leak and right empyema on 4/18/2023. He presents today for clinical follow up.   He continues to be under Palliative Care and receiving Dilaudid PCA for his ongoing pain.  He is being followed by Pulmonology. Dr. Otoole.   11/25/24 CT Chest non contrast through [facility] -Indeterminate left apical 0.6 x 1 cm nodular opacity is unchanged compared to 8/23/2024 CT chest but slightly progressed compared to 10/14/2023. -Indeterminate left lower lobe 1 cm nodular consolidative opacity is unchanged compared to 8/23/2024 but appears more dense compared to 10/14/2023.  Today he reports having recently been admitted for Influenza A with sepsis and hypoxemia. He was discharged 1/1/2025. He currently feels  aosft 7/10 pain in the chest. He feels beaten from the flu. He is feeling a little better

## 2025-02-18 NOTE — HISTORY OF PRESENT ILLNESS
[Former] : former [TextBox_4] : 60-year-old male with a history of stage IV chronic obstructive lung disease status post SBRT with wedge resection of left upper lobe mass (fungal abscess), right sided empyema, Ellisor flap with revision, bronchopleural fistula, right middle lobe lobectomy with removal of the 6 rib and chronic pain syndrome with chronic opioid dependency, chronic hypercapnic respiratory failure on noninvasive ventilation seen today in follow-up ([facility] surgical).  Patient doing well with continued complaint of right-sided chest pain.  Recent CAT scan was performed.  No increased complaints of cough wheeze or sputum production  Course recently complicated by influenza A requiring brief hospitalization.  Patient feels back to baseline.

## 2025-02-18 NOTE — DISCUSSION/SUMMARY
[FreeTextEntry1] : 60-year-old male with history of stage IV chronic obstructive lung disease with hypercapnic respiratory failure maintained on home noninvasive ventilator.  Recent inflammatory changes on CAT scan are new.  Follow-up CT in 3 months from the date of this last CT.  Patient is also advised to consider pulmonary rehab and will undergo cardiac clearance prior to it.

## 2025-02-18 NOTE — REASON FOR VISIT
[Follow-Up] : a follow-up visit [Lung Cancer] : lung cancer [COPD] : COPD [Spouse] : spouse [TextBox_44] : respiratory failure

## 2025-02-18 NOTE — PHYSICAL EXAM
[No Acute Distress] : no acute distress [Normal Oropharynx] : normal oropharynx [Normal Appearance] : normal appearance [No Neck Mass] : no neck mass [Normal Rate/Rhythm] : normal rate/rhythm [Normal S1, S2] : normal s1, s2 [No Murmurs] : no murmurs [No Resp Distress] : no resp distress [Clear to Auscultation Bilaterally] : clear to auscultation bilaterally [Benign] : benign [Normal Gait] : normal gait [No Clubbing] : no clubbing [No Cyanosis] : no cyanosis [No Edema] : no edema [FROM] : FROM [Normal Color/ Pigmentation] : normal color/ pigmentation [No Focal Deficits] : no focal deficits [Oriented x3] : oriented x3 [Normal Affect] : normal affect [TextBox_2] : Thin cachectic male [TextBox_80] :  posterior chest wall defect with minimal air on exhalation

## 2025-02-18 NOTE — CONSULT LETTER
[Dear  ___] : Dear  [unfilled], [Consult Letter:] : I had the pleasure of evaluating your patient, [unfilled]. [Please see my note below.] : Please see my note below. [Consult Closing:] : Thank you very much for allowing me to participate in the care of this patient.  If you have any questions, please do not hesitate to contact me. [Sincerely,] : Sincerely, [FreeTextEntry3] : Philip Otoole MD FCCP Pulmonary/Critical Care/Sleep Medicine Department of Internal Medicine  [facility]

## 2025-03-10 NOTE — HISTORY OF PRESENT ILLNESS
[FreeTextEntry1] : Mr. ALYSON QURESHI is a 60-year-old male who presents today for followup. Previously, he has been followed for right side lung cancer s/p SBRT, wedge resection of KIMBERLY lesion fungal abscess, right side empyema with Eloesser flap, osteomyelitis of right right, chronic rib pain with chronic opioid dependence, COPD, emphysema with bullous upper lobe disease and a bronchopleural fistula who underwent endobronchial valve placement to RML, removal of 6th rib and Eloesser flap revision for persistent air leak and right empyema on 4/18/2023. He presents today for clinical follow up.   He continues to be under Palliative Care and receiving Dilaudid PCA for his ongoing pain.  He is being followed by Pulmonology. Dr. Otoole.   11/25/24 CT Chest non contrast through [facility] -Indeterminate left apical 0.6 x 1 cm nodular opacity is unchanged compared to 8/23/2024 CT chest but slightly progressed compared to 10/14/2023. -Indeterminate left lower lobe 1 cm nodular consolidative opacity is unchanged compared to 8/23/2024 but appears more dense compared to 10/14/2023.  Today he reports having recently been admitted for Influenza A with sepsis and hypoxemia. He was discharged 1/1/2025. He currently feels  aosft 7/10 pain in the chest. He feels beaten from the flu. He is feeling a little better

## 2025-03-10 NOTE — REVIEW OF SYSTEMS
[de-identified] : per mom pt not feeling well x 2 weeks nasal congestion headache no fever vomited thursday none since, no loose stool or diarrhea  [FreeTextEntry6] : no fever\par minimal cough\par appetite has been normal\par  [Feeling Poorly] : feeling poorly [Feeling Tired] : feeling tired [As Noted in HPI] : as noted in HPI [Negative] : Heme/Lymph [Chest Pain] : chest pain

## 2025-03-10 NOTE — DATA REVIEWED
[FreeTextEntry1] : Independent review of imaging and independent interpretation was performed at today's visit. -11/25/24 CT Chest non contrast through [facility]

## 2025-03-10 NOTE — DATA REVIEWED
[FreeTextEntry1] : Independent review of imaging and independent interpretation was performed at today's visit. -11/25/24 CT Chest non contrast through [facility]

## 2025-03-10 NOTE — REVIEW OF SYSTEMS
[Feeling Poorly] : feeling poorly [Feeling Tired] : feeling tired [As Noted in HPI] : as noted in HPI [Negative] : Heme/Lymph [Chest Pain] : chest pain

## 2025-03-10 NOTE — ASSESSMENT
[FreeTextEntry1] : Jose is a 60-year-old male with a thoracic window and a persistent air leak.  He states he recently had pneumonia but is improving on a daily basis.  I would like to see him in 1 month's time.  Thank you for allowing me to participate in the care of your patient.  30 minutes was spent during this encounter.  Dylan Mcgregor MD Department of Cardiovascular and Thoracic Surgery  [employer] at [facility]

## 2025-03-10 NOTE — ASSESSMENT
[FreeTextEntry1] : Jose is a 60-year-old male with a thoracic window and a persistent air leak.  He states he recently had pneumonia but is improving on a daily basis.  I would like to see him in 1 month's time.  Thank you for allowing me to participate in the care of your patient.  30 minutes was spent during this encounter.  Dylan Mcgregor MD Department of Cardiovascular and Thoracic Surgery  [employer] at [facility]

## 2025-03-10 NOTE — HISTORY OF PRESENT ILLNESS
[FreeTextEntry1] : Mr. ALYSON QURESHI is a 60-year-old male who presents today for followup. Previously, he has been followed for right side lung cancer s/p SBRT, wedge resection of KIMBERLY lesion fungal abscess, right side empyema with Eloesser flap, osteomyelitis of right right, chronic rib pain with chronic opioid dependence, COPD, emphysema with bullous upper lobe disease and a bronchopleural fistula who underwent endobronchial valve placement to RML, removal of 6th rib and Eloesser flap revision for persistent air leak and right empyema on 4/18/2023. He presents today for clinical follow up.   He continues to be under Palliative Care and receiving Dilaudid PCA for his ongoing pain.  He is being followed by Pulmonology. Dr. Otoole.   11/25/24 CT Chest non contrast through [facility] -Indeterminate left apical 0.6 x 1 cm nodular opacity is unchanged compared to 8/23/2024 CT chest but slightly progressed compared to 10/14/2023. -Indeterminate left lower lobe 1 cm nodular consolidative opacity is unchanged compared to 8/23/2024 but appears more dense compared to 10/14/2023.  Today he reports having recently been admitted for Influenza A with sepsis and hypoxemia. He was discharged 1/1/2025. He currently feels  aosft 7/10 pain in the chest. He feels beaten from the flu. He is feeling a little better

## 2025-03-18 NOTE — PHYSICAL EXAM
[Well Developed] : well developed [Well Nourished] : well nourished [No Acute Distress] : no acute distress [Normal Conjunctiva] : normal conjunctiva [Normal Venous Pressure] : normal venous pressure [No Carotid Bruit] : no carotid bruit [Normal S1, S2] : normal S1, S2 [No Murmur] : no murmur [No Rub] : no rub [No Gallop] : no gallop [Soft] : abdomen soft [Non Tender] : non-tender [No Masses/organomegaly] : no masses/organomegaly [Normal Bowel Sounds] : normal bowel sounds [Normal Gait] : normal gait [No Edema] : no edema [No Cyanosis] : no cyanosis [No Clubbing] : no clubbing [No Varicosities] : no varicosities [No Rash] : no rash [No Skin Lesions] : no skin lesions [Moves all extremities] : moves all extremities [No Focal Deficits] : no focal deficits [Normal Speech] : normal speech [Alert and Oriented] : alert and oriented [Normal memory] : normal memory [de-identified] : poor ai entry.  whistling sound on the rgith lung . likely lung valve . decrease breasth sounds in bases.  right lung decrese breasth soounds in the base.

## 2025-03-18 NOTE — CARDIOLOGY SUMMARY
[de-identified] : 3 18 2025:  Sinus Rhythm  -Right atrial enlargement. BORDERLINE [de-identified] : CT chest lung:  moderate coronary calcifications

## 2025-03-18 NOTE — DISCUSSION/SUMMARY
[Patient] : the patient [Risks] : risks [Benefits] : benefits [Alternatives] : alternatives [With Me] : with me [___ Month(s)] : in [unfilled] month(s) [EKG obtained to assist in diagnosis and management of assessed problem(s)] : EKG obtained to assist in diagnosis and management of assessed problem(s) [FreeTextEntry1] : This is a 60 year old male with extensive smoking history , emphysema,  prior lung cnacer in right lugn with cyberknife resectiion,  and fungal infection with left partial  lobectomy, prior VATS,  here for coronary artery disease evalautiona dn dyspnea on exertion   1) coronary artery disease evaluationa dn dyspnea on exertion :  intermediate risk factors for coronary artery disease.  echocardiogram with contrast if needed.   Nuclear stress test with IV technetium radiotracer.  2) [facility] lipid propfile.   rona will obtain from PCP .  3) coronary calcifications:  consider statins after checkign choelsterol.  stress test as barbara rea has no restrictions to get to exercise program. he can intiaite his pulmonary rehab

## 2025-03-18 NOTE — HISTORY OF PRESENT ILLNESS
[FreeTextEntry1] : coronary artery disease evalaution  , dyspnea on exertion   This is a 60 year old male with extensive smoking history , emphysema,  prior lung cnacer in right lugn with cyberknife resectiion,  and fungal infection with left partial  lobectomy, prior VATS,  here for coronary artery disease evalautiona dn dyspnea on exertion  he has chronic dyspnea on exertion .  and also has multiple rib problems.   he is schedueld for a pulmonary rehab.  and exerise program   No smoking. No alcohol. No drugs.  Smoking:   Quit:  1 year ago Smoked for  45years.   Smoked  2 pack a day for  40 years.  Family history:  Father:  no myocardial infarction. no Cerebro vascular accident  Mother :  no myocardial infarction. No cerebro vascualr accident Siblings:  +  myocardial infarction.  No CVA  brotherL: myocardial infarction  CABG

## 2025-03-19 NOTE — PHYSICAL EXAM
[Sclera] : the sclera and conjunctiva were normal [Extraocular Movements] : extraocular movements were intact [No Oral Pallor] : no oral pallor [Neck Appearance] : the appearance of the neck was normal [Exaggerated Use Of Accessory Muscles For Inspiration] : no accessory muscle use [Heart Sounds] : normal S1 and S2 [Edema] : there was no peripheral edema [Bowel Sounds] : normal bowel sounds [Involuntary Movements] : no involuntary movements were seen [] : no rash [No Focal Deficits] : no focal deficits [Affect] : the affect was normal [Mood] : the mood was normal [FreeTextEntry1] : abnl spinal curvature.

## 2025-03-19 NOTE — HISTORY OF PRESENT ILLNESS
[FreeTextEntry1] : 59 y/o M COPD with RUL NSCLC S/p CyberKnife 6000 cGy in 4 fractions to RUL 12/12/16 - 12/15/16 stage IB poorly differentiated carcinoma. He also had a KIMBERLY wedge resection, on at site of surgery, right side empyema with Eloesser flap, osteomyelitis s/p multiple IV Abx regimens, chronic rib pain with chronic opioid dependence, COPD, emphysema with bullous upper lobe disease and a bronchopleural fistula who underwent endobronchial valve placement to RML, removal of 6th rib and Eloesser flap revision for persistent air leak and right empyema on 4/18/2023, Aspergilloma s/p course of voriconazole comes for f/u for pain management.   Post Hospitalization Discharge from [facility] 1/22. Post Hospitalization Day 15 for Opioid rotation to fentanyl PCA secondary to Dilaudid shortage. Accompanied by wife Terrie  # Chronic pain syndrome - Severe pain History of evolving pain as well as tolerance/opioid dependence complicated by existential pain Patient has failed sertraline and lexapro for treatment of depression and anxiety He was discharged on fentanyl gtt at 7 mcg/kg/h for dilaudid substitution due to shortage no longer effective [facility] care notification was that Dilaudid should be in stock after 3/3/25  #Anxiety On Xanax 1 mg 3 times daily as needed  # Advanced COPD Has oxygen concentrator as needed Pulmonary following closely for inhaler optimization Recently recovered from influenza  #Debility 40%, unable to walk much. Mainly sit/lie because of pain

## 2025-03-19 NOTE — REVIEW OF SYSTEMS
[SOB on Exertion] : shortness of breath during exertion [Joint Stiffness] : joint stiffness [Skin Wound] : skin wound [As Noted in HPI] : as noted in HPI [Anxiety] : anxiety [Negative] : Heme/Lymph [Fever] : no fever [Chills] : no chills [Shortness Of Breath] : no shortness of breath [Wheezing] : no wheezing [Cough] : no cough [Abdominal Pain] : no abdominal pain [Constipation] : no constipation [Diarrhea] : no diarrhea [Dysuria] : no dysuria [Confused] : no confusion [Dizziness] : no dizziness [Fainting] : no fainting [Depression] : no depression

## 2025-04-02 NOTE — REASON FOR VISIT
[Initial Consultation] : an initial pain management consultation [Friend] : friend [FreeTextEntry2] : right thoracic pain

## 2025-04-02 NOTE — PHYSICAL EXAM
[Normal] : Normal affect [de-identified] : Sclera anicteric. [de-identified] : Dressing in place over right posterior thorax, clean and dry.  Tender to palpation over right thoracic area in the vicinity of the wound.  No allodynia appreciated.   [de-identified] : Cranial nerves grossly intact. [de-identified] : Normal skin turgor appreciated.

## 2025-04-02 NOTE — ASSESSMENT
[FreeTextEntry1] : 60 year-old male s/p thoracotomy and lung resection for lung cancer with chronic non-healing thoracic wound and severe, refractory thoracic pain despite IV opioid therapy.  Plan:  1.  We will plan for an image-guided thoracic epidural steroid injection to help with his pain and to perhaps also contain his opioid requirements.  The procedure was explained to the patient.  Benefits such as pain relief and risks including bleeding and infection were discussed.  The patient expressed understanding and wishes to proceed.

## 2025-04-02 NOTE — HISTORY OF PRESENT ILLNESS
[FreeTextEntry1] : Mr. ALYSON QURESHI is a 60-year-old male who presents today for right-sided thoracic pain. Patient's history is remarkable for right side lung cancer s/p SBRT, wedge resection of KIMBERLY lesion fungal abscess, right side empyema with Eloesser flap, osteomyelitis of right right, chronic rib pain with chronic opioid dependence, COPD, emphysema with bullous upper lobe disease and a bronchopleural fistula who underwent endobronchial valve placement to RML, removal of 6th rib and Eloesser flap revision for persistent air leak and right empyema on 4/18/2023.  He has a chronic, non-healing thoracic wound which is packed and dressed.  He reports he has been evaluated by Plastics and is not a candidate for closure of the wound due to his overall health status.  He complains of constant, 8/10 pain in the vicinity of the wound which radiates to his right latissimus and right shoulder regions.  He is on an IV PCA hydromorphone at 8 mg/hr continuous and 8 mg demand every 15 minutes.  He also takes Lyrica 50 mg tid for pain.  The patient reports he is currently off of any antibiotic therapy.  He denies allergies and anticoagulant therapy.

## 2025-04-02 NOTE — ADDENDUM
[FreeTextEntry1] : I spent 45 minutes in total patient care time today, including data review and analysis, patient interview/examination, and detailed discussion of the plan of care with the patient.   Balwinder Jim MD, MA

## 2025-04-07 NOTE — REVIEW OF SYSTEMS
[Feeling Tired] : feeling tired [Negative] : Heme/Lymph [Feeling Poorly] : feeling poorly [Chest Pain] : chest pain [As Noted in HPI] : as noted in HPI

## 2025-04-07 NOTE — PHYSICAL EXAM
[Sclera] : the sclera and conjunctiva were normal [Examination Of The Chest] : the chest was normal in appearance [Chest Visual Inspection Thoracic Asymmetry] : no chest asymmetry [Diminished Respiratory Excursion] : normal chest expansion [FreeTextEntry1] : Wound is clean [No Focal Deficits] : no focal deficits [Oriented To Time, Place, And Person] : oriented to person, place, and time [Impaired Insight] : insight and judgment were intact [Affect] : the affect was normal

## 2025-04-07 NOTE — ASSESSMENT
[FreeTextEntry1] : Jose is a 60-year-old male with a thoracic window and a persistent air leak.  He states he recently had pneumonia but is improving on a daily basis.  I would like to see him in 1 month's time.  Thank you for allowing me to participate in the care of your patient.  30 minutes was spent during this encounter.  Dylan Mcgregor MD Department of Cardiovascular and Thoracic Surgery  [employer] at [facility]

## 2025-04-07 NOTE — DATA REVIEWED
[No studies available for review at this time.] : No studies available for review at this time. [FreeTextEntry1] : Independent review of imaging and independent interpretation was performed at today's visit. -11/25/24 CT Chest non contrast through [facility]

## 2025-04-07 NOTE — ASSESSMENT
[FreeTextEntry1] : Jose is a 60-year-old male with a thoracic window and a persistent air leak.  He states he recently had pneumonia but is improving on a daily basis.  I would like to see him in 1 month's time.  Thank you for allowing me to participate in the care of your patient.  30 minutes was spent during this encounter.  Dylan Mcgregor MD Department of Cardiovascular and Thoracic Surgery  [employer] at [facility]

## 2025-04-07 NOTE — DATA REVIEWED
[No studies available for review at this time.] : No studies available for review at this time. [FreeTextEntry1] : Independent review of imaging and independent interpretation was performed at today's visit. -11/25/24 CT Chest non contrast through [facility]

## 2025-04-07 NOTE — HISTORY OF PRESENT ILLNESS
[FreeTextEntry1] : Mr. ALYSON QURESHI is a 60-year-old male who presents today for followup. Previously, he has been followed for right side lung cancer s/p SBRT, wedge resection of KIMBERLY lesion fungal abscess, right side empyema with Eloesser flap, osteomyelitis of right right, chronic rib pain with chronic opioid dependence, COPD, emphysema with bullous upper lobe disease and a bronchopleural fistula who underwent endobronchial valve placement to RML, removal of 6th rib and Eloesser flap revision for persistent air leak and right empyema on 4/18/2023. He presents today for clinical follow up.   He continues to be under Palliative Care and receiving Dilaudid PCA for his ongoing pain.  He is being followed by Pulmonology. Dr. Otoole.   11/25/24 CT Chest non contrast through [facility] -Indeterminate left apical 0.6 x 1 cm nodular opacity is unchanged compared to 8/23/2024 CT chest but slightly progressed compared to 10/14/2023. -Indeterminate left lower lobe 1 cm nodular consolidative opacity is unchanged compared to 8/23/2024 but appears more dense compared to 10/14/2023.  Today he reports having recently been admitted for Influenza A with sepsis and hypoxemia. He was discharged 1/1/2025. He currently feels  aosft 7/10 pain in the chest. He feels beaten from the flu. He is feeling a little better

## 2025-04-07 NOTE — HISTORY OF PRESENT ILLNESS
[FreeTextEntry1] : Mr. ALYSON QURESHI is a 60-year-old male who presents today for followup. Previously, he has been followed for right side lung cancer s/p SBRT, wedge resection of KIMBERLY lesion fungal abscess, right side empyema with Eloesser flap, osteomyelitis of right right, chronic rib pain with chronic opioid dependence, COPD, emphysema with bullous upper lobe disease and a bronchopleural fistula who underwent endobronchial valve placement to RML, removal of 6th rib and Eloesser flap revision for persistent air leak and right empyema on 4/18/2023. He presents today for clinical follow up.   He continues to be under Palliative Care and receiving Dilaudid PCA for his ongoing pain.  He is being followed by Pulmonology. Dr. Otoole.   11/25/24 CT Chest non contrast through [facility] -Indeterminate left apical 0.6 x 1 cm nodular opacity is unchanged compared to 8/23/2024 CT chest but slightly progressed compared to 10/14/2023. -Indeterminate left lower lobe 1 cm nodular consolidative opacity is unchanged compared to 8/23/2024 but appears more dense compared to 10/14/2023.  Today he reports having recently been admitted for Influenza A with sepsis and hypoxemia. He was discharged 1/1/2025. He currently feels  aosft 7/10 pain in the chest. He feels beaten from the flu. He is feeling a little better

## 2025-04-25 NOTE — PHYSICAL EXAM
[General Appearance - In No Acute Distress] : in no acute distress [General Appearance - Alert] : alert [FreeTextEntry1] : + spinal tenderness mid low back

## 2025-04-25 NOTE — REVIEW OF SYSTEMS
[Fever] : no fever [Chills] : no chills [Shortness Of Breath] : no shortness of breath [Wheezing] : no wheezing 10/4 CT Head revealed small bilateral acute on chronic convexity subdural hematomas. Repeat CT Head on 10/5, 10/6 and 10/11 is stable with 10/16 results showing SDH decreasing in size. Repeat CT Head 10/23 with some small area of bleeding into previous collection.   Continue Keppra 500mg BID for seizure ppx   Pain management   Neurosurgery following, no intervention at this time.   Repeat Head CT for worsening headaches on 11/3 c/w nearly resolved subdural hematomas [Cough] : no cough [SOB on Exertion] : shortness of breath during exertion [Abdominal Pain] : no abdominal pain [Constipation] : no constipation [Diarrhea] : no diarrhea [Dysuria] : no dysuria [Joint Stiffness] : joint stiffness [Skin Wound] : skin wound [Confused] : no confusion [Dizziness] : no dizziness [Fainting] : no fainting [As Noted in HPI] : as noted in HPI [Anxiety] : anxiety [Depression] : no depression [Negative] : Heme/Lymph

## 2025-04-25 NOTE — HISTORY OF PRESENT ILLNESS
[FreeTextEntry1] : 61 y/o M COPD with RUL NSCLC S/p CyberKnife 6000 cGy in 4 fractions to RUL 12/12/16 - 12/15/16 stage IB poorly differentiated carcinoma. He also had a KIMBERLY wedge resection, on at site of surgery, right side empyema with Eloesser flap, osteomyelitis s/p multiple IV Abx regimens, chronic rib pain with chronic opioid dependence, COPD, emphysema with bullous upper lobe disease and a bronchopleural fistula who underwent endobronchial valve placement to RML, removal of 6th rib and Eloesser flap revision for persistent air leak and right empyema on 4/18/2023, Aspergilloma s/p course of voriconazole comes for f/u for pain management.   Interval History: Was increased with his PCA pump at 10 mg/h with breakthrough 10 mg every 15 minutes as needed for breakthrough pain.  Patient is pending colonoscopy on 5/5.  He is pending a Moh's surgery with plastics in the coming month.  Of recent he wakes up very short of breath and air hungry.  Recent CT is showing no new changes.  Pulmonary visit follow-up due 5/20  # Chronic pain syndrome - Severe pain History of evolving pain as well as tolerance/opioid dependence complicated by existential pain Patient has failed sertraline and lexapro for treatment of depression and anxiety Currently on Dilaudid PCA stable at baseline pain of 7 out of 10 Pending insurance clearance of epidural injections via pain management  #Anxiety On Xanax 1 mg 3 times daily as needed  # Advanced COPD Has oxygen concentrator as needed Pulmonary following closely for inhaler optimization  #Debility 40%, unable to walk much. Mainly sit/lie because of pain

## 2025-04-29 NOTE — HISTORY OF PRESENT ILLNESS
[FreeTextEntry1] : Mr. ALYSON QURESHI is a 60-year-old male who presents today for follow up visit. Previously, he has been followed for right side lung cancer s/p SBRT, wedge resection of KIMBERLY lesion fungal abscess, right side empyema with Eloesser flap, osteomyelitis of right right, chronic rib pain with chronic opioid dependence, COPD, emphysema with bullous upper lobe disease and a bronchopleural fistula who underwent endobronchial valve placement to RML, removal of 6th rib and Eloesser flap revision for persistent air leak and right empyema on 4/18/2023. He presents today for clinical follow up.  He continues to be under Palliative Care and receiving Dilaudid PCA for his ongoing pain.  He is being followed by Pulmonology. Dr. Otoole.  11/25/24 CT Chest non contrast through [facility] -Indeterminate left apical 0.6 x 1 cm nodular opacity is unchanged compared to 8/23/2024 CT chest but slightly progressed compared to 10/14/2023. -Indeterminate left lower lobe 1 cm nodular consolidative opacity is unchanged compared to 8/23/2024 but appears more dense compared to 10/14/2023.  2/1/25 CT Chest non contrast through [facility] -Extensive postsurgical changes in the right hemithorax. Severe emphysema. Stable 14 x 10 mm irregular opacity in the left upper lobe. - The previously seen subpleural opacity in the left lower lobe demonstrates partial resolution. There is a new 18 x 11 mm left lower lobe opacity which may be inflammatory in nature.   4/11/25 CT Chest non contrast through [facility] -Extensive postsurgical changes right chest wall. -Resolution of previously seen nodular opacity left lower lobe. -No evidence for intra-abdominal pathology demonstrated

## 2025-04-29 NOTE — DATA REVIEWED
[FreeTextEntry1] : Independent review of imaging and independent interpretation was performed at today's visit. -4/11/25 CT Chest non contrast through [facility]

## 2025-05-12 NOTE — DATA REVIEWED
[No studies available for review at this time.] : No studies available for review at this time. [FreeTextEntry1] : Independent review of imaging and independent interpretation was performed at today's visit. -11/25/24 CT Chest non contrast through [facility]

## 2025-05-12 NOTE — DATA REVIEWED
[No studies available for review at this time.] : No studies available for review at this time. [FreeTextEntry1] : Independent review of imaging and independent interpretation was performed at today's visit. -11/25/24 CT Chest non contrast through [facility]

## 2025-05-12 NOTE — ASSESSMENT
[FreeTextEntry1] : Jose is a 60-year-old male with a thoracic window and a persistent air leak.  He states he recently had pneumonia but is improving on a daily basis.  I would like to see him in 1 month's time.  Thank you for allowing me to participate in the care of your patient.  30 minutes was spent during this encounter.  Dylan Mcgregor MD Department of Cardiovascular and Thoracic Surgery  [employer] at [facility]

## 2025-05-12 NOTE — HISTORY OF PRESENT ILLNESS
[FreeTextEntry1] : Mr. ALYSON QURESHI is a 60-year-old male who presents today for followup. Previously, he has been followed for right side lung cancer s/p SBRT, wedge resection of KIMBERLY lesion fungal abscess, right side empyema with Eloesser flap, osteomyelitis of right right, chronic rib pain with chronic opioid dependence, COPD, emphysema with bullous upper lobe disease and a bronchopleural fistula who underwent endobronchial valve placement to RML, removal of 6th rib and Eloesser flap revision for persistent air leak and right empyema on 4/18/2023. He presents today for clinical follow up.   He continues to be under Palliative Care and receiving Dilaudid PCA for his ongoing pain.  He is being followed by Pulmonology. Dr. Otoole.   11/25/24 CT Chest non contrast through [facility] -Indeterminate left apical 0.6 x 1 cm nodular opacity is unchanged compared to 8/23/2024 CT chest but slightly progressed compared to 10/14/2023. -Indeterminate left lower lobe 1 cm nodular consolidative opacity is unchanged compared to 8/23/2024 but appears more dense compared to 10/14/2023.  Today he reports having recently been admitted for Influenza A with sepsis and hypoxemia. He was discharged 1/1/2025. He currently feels  aosft 7/10 pain in the chest. He feels beaten from the flu. He is feeling a little better

## 2025-05-12 NOTE — HISTORY OF PRESENT ILLNESS
[FreeTextEntry1] : Mr. ALYSON QURESHI is a 60-year-old male who presents today for followup. Previously, he has been followed for right side lung cancer s/p SBRT, wedge resection of KIMBERLY lesion fungal abscess, right side empyema with Eloesser flap, osteomyelitis of right right, chronic rib pain with chronic opioid dependence, COPD, emphysema with bullous upper lobe disease and a bronchopleural fistula who underwent endobronchial valve placement to RML, removal of 6th rib and Eloesser flap revision for persistent air leak and right empyema on 4/18/2023. He presents today for clinical follow up.   He continues to be under Palliative Care and receiving Dilaudid PCA for his ongoing pain.  He is being followed by Pulmonology. Dr. Otoole.   11/25/24 CT Chest non contrast through [facility] -Indeterminate left apical 0.6 x 1 cm nodular opacity is unchanged compared to 8/23/2024 CT chest but slightly progressed compared to 10/14/2023. -Indeterminate left lower lobe 1 cm nodular consolidative opacity is unchanged compared to 8/23/2024 but appears more dense compared to 10/14/2023.  Today he reports having recently been admitted for Influenza A with sepsis and hypoxemia. He was discharged 1/1/2025. He currently feels  aosft 7/10 pain in the chest. He feels beaten from the flu. He is feeling a little better

## 2025-05-12 NOTE — ASSESSMENT
[FreeTextEntry1] : Jose is a 60-year-old male with a thoracic window and a persistent air leak.  He states he recently had pneumonia but is improving on a daily basis.  I would like to see him in 1 month's time.  Thank you for allowing me to participate in the care of your patient.  30 minutes was spent during this encounter.  Dylan Mcgregor MD Department of Cardiovascular and Thoracic Surgery  [employer] at [facility]

## 2025-05-12 NOTE — REVIEW OF SYSTEMS
[SOB on Exertion] : shortness of breath during exertion [Feeling Poorly] : feeling poorly [Feeling Tired] : feeling tired [Chest Pain] : chest pain [As Noted in HPI] : as noted in HPI [Negative] : Heme/Lymph

## 2025-05-20 NOTE — HISTORY OF PRESENT ILLNESS
[Former] : former [TextBox_4] : 60-year-old male with a history of stage IV chronic obstructive lung disease status post SBRT with wedge resection of left upper lobe mass (fungal abscess), right sided empyema, Ellisor flap with revision, bronchopleural fistula, right middle lobe lobectomy with removal of the 6 rib and chronic pain syndrome with chronic opioid dependency, chronic hypercapnic respiratory failure on noninvasive ventilation seen today in follow-up ([facility] surgical).  Patient doing well with continued complaint of right-sided chest pain.  Most recently patient did note some increased complaints of shortness of breath 6 to 8 weeks ago.  He was changed by you from fluticasone/salmeterol and Incruse to Breztri which has relieved his complaints.  Patient is preop for colonoscopy.

## 2025-05-20 NOTE — CONSULT LETTER
[Dear  ___] : Dear  [unfilled], [Consult Letter:] : I had the pleasure of evaluating your patient, [unfilled]. [Please see my note below.] : Please see my note below. [Consult Closing:] : Thank you very much for allowing me to participate in the care of this patient.  If you have any questions, please do not hesitate to contact me. [Sincerely,] : Sincerely, [FreeTextEntry3] : Philip Otoole MD FCCP Pulmonary/Critical Care/Sleep Medicine Department of Internal Medicine  [facility]

## 2025-05-20 NOTE — REASON FOR VISIT
[Follow-Up] : a follow-up visit [Lung Cancer] : lung cancer [COPD] : COPD [Spouse] : spouse [Pre-op Risk Stratification] : pre-op risk stratification [TextBox_44] : respiratory failure

## 2025-05-20 NOTE — END OF VISIT
[Time Spent: ___ minutes] : I have spent [unfilled] minutes of time on the encounter which excludes teaching and separately reported services. [FreeTextEntry3] : PACS review

## 2025-05-20 NOTE — DISCUSSION/SUMMARY
[FreeTextEntry1] : 60-year-old male with history of stage IV chronic obstructive lung disease with chronic hypercapnic respiratory failure maintained on home noninvasive ventilator.  Recent changes on CAT scan have resolved and most likely were inflammatory in nature.  Patient is at a high risk for sedation but there is no absolute contraindication to proposed procedure.  I would recommend that this be done in hospital with mask ventilation or AVAPS.

## 2025-05-20 NOTE — PROCEDURE
[FreeTextEntry1] : 5/20/2025: Spirometry-FVC 2.93 L (83%), FEV1 1.01 L (36%), FEV1/FVC 34%.  No significant change when compared to previous values of 12/24 and 2/24

## 2025-06-09 NOTE — HISTORY OF PRESENT ILLNESS
[FreeTextEntry1] : Mr. ALYSON QURESHI is a 60-year-old male who presents today for followup. Previously, he has been followed for right side lung cancer s/p SBRT, wedge resection of KIMBERLY lesion fungal abscess, right side empyema with Eloesser flap, osteomyelitis of right right, chronic rib pain with chronic opioid dependence, COPD, emphysema with bullous upper lobe disease and a bronchopleural fistula who underwent endobronchial valve placement to RML, removal of 6th rib and Eloesser flap revision for persistent air leak and right empyema on 4/18/2023. He presents today for a 1 month clinical follow up.  He continues to be under Palliative Care and receiving Dilaudid PCA for his ongoing pain.  He is being followed by Pulmonology. Dr. Otoole.  11/25/24 CT Chest non contrast through [facility] -Indeterminate left apical 0.6 x 1 cm nodular opacity is unchanged compared to 8/23/2024 CT chest but slightly progressed compared to 10/14/2023. -Indeterminate left lower lobe 1 cm nodular consolidative opacity is unchanged compared to 8/23/2024 but appears more dense compared to 10/14/2023.  2/10/25 CT Chest non contrast through [facility] -Extensive postsurgical changes in the right hemithorax. Severe emphysema. Stable 14 x 10 mm irregular opacity in the left upper lobe. -The previously seen subpleural opacity in the left lower lobe demonstrates partial resolution. There is a new 18 x 11 mm left lower lobe opacity which may be inflammatory in nature.   4/15/25 CT Chest/Abdomen/Pelvis non contrast through [facility] -Extensive postsurgical changes right chest wall. -Resolution of previously seen nodular opacity left lower lobe. -No evidence for intra-abdominal pathology demonstrated

## 2025-06-23 NOTE — ASSESSMENT
[FreeTextEntry1] : Jose is a 60-year-old male with a thoracic window and a persistent air leak.  He is improving on a daily basis.  I would like to see him in 1 month's time.  Thank you for allowing me to participate in the care of your patient.  30 minutes was spent during this encounter.  Dylan Mcgregor MD Department of Cardiovascular and Thoracic Surgery  [employer] at [facility]

## 2025-06-23 NOTE — DATA REVIEWED
[FreeTextEntry1] : Independent review of imaging and independent interpretation was performed at today's visit. -11/25/24 CT Chest non contrast through [facility]

## 2025-06-23 NOTE — HISTORY OF PRESENT ILLNESS
[FreeTextEntry1] : Mr. ALYSON QURESHI is a 60-year-old male who presents today for followup. Previously, he has been followed for right side lung cancer s/p SBRT, wedge resection of KIMBERLY lesion fungal abscess, right side empyema with Eloesser flap, osteomyelitis of right right, chronic rib pain with chronic opioid dependence, COPD, emphysema with bullous upper lobe disease and a bronchopleural fistula who underwent endobronchial valve placement to RML, removal of 6th rib and Eloesser flap revision for persistent air leak and right empyema on 4/18/2023. He presents today for clinical follow up.   He continues to be under Palliative Care and receiving Dilaudid PCA for his ongoing pain.  He is being followed by Pulmonology. Dr. Otoole.   11/25/24 CT Chest non contrast through [facility] -Indeterminate left apical 0.6 x 1 cm nodular opacity is unchanged compared to 8/23/2024 CT chest but slightly progressed compared to 10/14/2023. -Indeterminate left lower lobe 1 cm nodular consolidative opacity is unchanged compared to 8/23/2024 but appears more dense compared to 10/14/2023.  Today he reports having recently been admitted for Influenza A with sepsis and hypoxemia. He was discharged 1/1/2025. He currently feels  aosft 7/10 pain in the chest. He feels beaten from the flu. He is feeling a little better

## 2025-06-23 NOTE — ASSESSMENT
[FreeTextEntry1] : Jose is a 60-year-old male with a thoracic window and a persistent air leak.  He is improving on a daily basis.  I would like to see him in 1 month's time.  Thank you for allowing me to participate in the care of your patient.  30 minutes was spent during this encounter.  Dylan Mcgregor MD Department of Cardiovascular and Thoracic Surgery  [employer] at [facility]

## 2025-06-23 NOTE — REVIEW OF SYSTEMS
[Feeling Poorly] : feeling poorly [Feeling Tired] : feeling tired [Chest Pain] : chest pain [SOB on Exertion] : shortness of breath during exertion [As Noted in HPI] : as noted in HPI [Negative] : Heme/Lymph

## 2025-06-23 NOTE — HISTORY OF PRESENT ILLNESS
[FreeTextEntry1] : Mr. ALYSON QURESHI is a 60-year-old male who presents today for followup. Previously, he has been followed for right side lung cancer s/p SBRT, wedge resection of KIMBERLY lesion fungal abscess, right side empyema with Eloesser flap, osteomyelitis of right right, chronic rib pain with chronic opioid dependence, COPD, emphysema with bullous upper lobe disease and a bronchopleural fistula who underwent endobronchial valve placement to RML, removal of 6th rib and Eloesser flap revision for persistent air leak and right empyema on 4/18/2023. He presents today for clinical follow up.   He continues to be under Palliative Care and receiving Dilaudid PCA for his ongoing pain.  He is being followed by Pulmonology. Dr. Otoole.   11/25/24 CT Chest non contrast through [facility] -Indeterminate left apical 0.6 x 1 cm nodular opacity is unchanged compared to 8/23/2024 CT chest but slightly progressed compared to 10/14/2023. -Indeterminate left lower lobe 1 cm nodular consolidative opacity is unchanged compared to 8/23/2024 but appears more dense compared to 10/14/2023.  Today he reports having recently been admitted for Influenza A with sepsis and hypoxemia. He was discharged 1/1/2025. He currently feels  aosft 7/10 pain in the chest. He feels beaten from the flu. He is feeling a little better

## 2025-06-23 NOTE — DATA REVIEWED
[FreeTextEntry1] : Independent review of imaging and independent interpretation was performed at today's visit. -11/25/24 CT Chest non contrast through [facility]

## 2025-07-16 NOTE — HISTORY OF PRESENT ILLNESS
[FreeTextEntry1] : 61 y/o M COPD with RUL NSCLC S/p CyberKnife 6000 cGy in 4 fractions to RUL 12/12/16 - 12/15/16 stage IB poorly differentiated carcinoma. He also had a KIMBERLY wedge resection, on at site of surgery, right side empyema with Eloesser flap, osteomyelitis s/p multiple IV Abx regimens, chronic rib pain with chronic opioid dependence, COPD, emphysema with bullous upper lobe disease and a bronchopleural fistula who underwent endobronchial valve placement to RML, removal of 6th rib and Eloesser flap revision for persistent air leak and right empyema on 4/18/2023, Aspergilloma s/p course of voriconazole comes for f/u for pain management.   Interval History: Patient states that he is stable with the current pain regimen.  He has had a difficult time with family medical emergencies concerning his brother and his mother.  He has a Mohs surgery scheduled for 8/12.  His PCP is aware that there are concerns about his swallowing pills and is pending scheduling for endoscopy colonoscopy.  Breathing has improved on Breztri and is off continuous O2 therapy. Pending wound eval this monday with Dr. Vogel.   # Chronic pain syndrome  History of evolving pain as well as tolerance/opioid dependence complicated by existential pain Patient has failed sertraline and lexapro for treatment of depression and anxiety Currently on Dilaudid PCA stable at baseline pain of  5-6 out of 10 Pending insurance clearance of epidural injections via pain management  #Anxiety On Xanax 1 mg 3 times daily as needed  # Advanced COPD Has oxygen concentrator as needed Pulmonary following closely for inhaler optimization  #Debility 50%, unable to walk much. Mainly sit/lie because of pain

## 2025-07-16 NOTE — HISTORY OF PRESENT ILLNESS
[FreeTextEntry1] : 59 y/o M COPD with RUL NSCLC S/p CyberKnife 6000 cGy in 4 fractions to RUL 12/12/16 - 12/15/16 stage IB poorly differentiated carcinoma. He also had a KIMBERLY wedge resection, on at site of surgery, right side empyema with Eloesser flap, osteomyelitis s/p multiple IV Abx regimens, chronic rib pain with chronic opioid dependence, COPD, emphysema with bullous upper lobe disease and a bronchopleural fistula who underwent endobronchial valve placement to RML, removal of 6th rib and Eloesser flap revision for persistent air leak and right empyema on 4/18/2023, Aspergilloma s/p course of voriconazole comes for f/u for pain management.   Interval History: Patient states that he is stable with the current pain regimen.  He has had a difficult time with family medical emergencies concerning his brother and his mother.  He has a Mohs surgery scheduled for 8/12.  His PCP is aware that there are concerns about his swallowing pills and is pending scheduling for endoscopy colonoscopy.  Breathing has improved on Breztri and is off continuous O2 therapy. Pending wound eval this monday with Dr. Vogel.   # Chronic pain syndrome  History of evolving pain as well as tolerance/opioid dependence complicated by existential pain Patient has failed sertraline and lexapro for treatment of depression and anxiety Currently on Dilaudid PCA stable at baseline pain of  5-6 out of 10 Pending insurance clearance of epidural injections via pain management  #Anxiety On Xanax 1 mg 3 times daily as needed  # Advanced COPD Has oxygen concentrator as needed Pulmonary following closely for inhaler optimization  #Debility 50%, unable to walk much. Mainly sit/lie because of pain

## 2025-07-16 NOTE — REASON FOR VISIT
[Family Member] : family member [Spouse] : spouse [Home] : at home, [unfilled] , at the time of the visit. [Medical Office: ([facility])___] : at the medical office located in  [Telehealth (audio & video)] : This visit was provided via telehealth using real-time 2-way audio visual technology. [Verbal consent obtained from patient] : the patient, [unfilled]

## 2025-07-16 NOTE — REASON FOR VISIT
[Family Member] : family member [Spouse] : spouse [Home] : at home, [unfilled] , at the time of the visit. [Medical Office: ([facility])___] : at the medical office located in  [Telehealth (audio & video)] : This visit was provided via telehealth using real-time 2-way audio visual technology. [Verbal consent obtained from patient] : the patient, [unfilled]

## 2025-07-16 NOTE — PHYSICAL EXAM
[General Appearance - Alert] : alert [General Appearance - In No Acute Distress] : in no acute distress [FreeTextEntry1] : little nervous/ anxious

## 2025-07-21 NOTE — ASSESSMENT
[FreeTextEntry1] : Jose is a 60-year-old male with a thoracic window and a persistent air leak.  He is improving on a daily basis.  I would like to see him in 1 month's time.  Thank you for allowing me to participate in the care of your patient.  30 minutes was spent during this encounter.  Dylan Mcgregor MD Department of Cardiovascular and Thoracic Surgery  [employer] at [facility]

## 2025-07-21 NOTE — PHYSICAL EXAM
[Sclera] : the sclera and conjunctiva were normal [Chest Visual Inspection Thoracic Asymmetry] : no chest asymmetry [Examination Of The Chest] : the chest was normal in appearance [Diminished Respiratory Excursion] : normal chest expansion [FreeTextEntry1] : Wound is clean [No Focal Deficits] : no focal deficits [Oriented To Time, Place, And Person] : oriented to person, place, and time [Impaired Insight] : insight and judgment were intact [Affect] : the affect was normal

## 2025-07-21 NOTE — DATA REVIEWED
[No studies available for review at this time.] : No studies available for review at this time. [FreeTextEntry1] : Independent review of imaging and independent interpretation was performed at today's visit. -11/25/24 CT Chest non contrast through [facility]

## 2025-07-21 NOTE — ASSESSMENT
[FreeTextEntry1] : Jose is a 60-year-old male with a thoracic window and a persistent air leak.  He is improving on a daily basis.  I would like to see him in 1 month's time.  Thank you for allowing me to participate in the care of your patient.  30 minutes was spent during this encounter.  Dylan Mcgregor MD Department of Cardiovascular and Thoracic Surgery  [employer] at [facility]

## 2025-07-21 NOTE — DATA REVIEWED
[No studies available for review at this time.] : No studies available for review at this time. [FreeTextEntry1] : Independent review of imaging and independent interpretation was performed at today's visit. -11/25/24 CT Chest non contrast through [facility]

## 2025-07-21 NOTE — HISTORY OF PRESENT ILLNESS
[FreeTextEntry1] : Mr. ALYSON QURESHI is a 60-year-old male who presents today for followup. Previously, he has been followed for right side lung cancer s/p SBRT, wedge resection of KIMBERLY lesion fungal abscess, right side empyema with Eloesser flap, osteomyelitis of right right, chronic rib pain with chronic opioid dependence, COPD, emphysema with bullous upper lobe disease and a bronchopleural fistula who underwent endobronchial valve placement to RML, removal of 6th rib and Eloesser flap revision for persistent air leak and right empyema on 4/18/2023. He presents today for clinical follow up.   He continues to be under Palliative Care and receiving Dilaudid PCA for his ongoing pain.  He is being followed by Pulmonology. Dr. Otoole.   11/25/24 CT Chest non contrast through [facility] -Indeterminate left apical 0.6 x 1 cm nodular opacity is unchanged compared to 8/23/2024 CT chest but slightly progressed compared to 10/14/2023. -Indeterminate left lower lobe 1 cm nodular consolidative opacity is unchanged compared to 8/23/2024 but appears more dense compared to 10/14/2023.  Today he reports having recently been admitted for Influenza A with sepsis and hypoxemia. He was discharged 1/1/2025. He currently feels  aosft 7/10 pain in the chest. He feels beaten from the flu. He is feeling a little better

## 2025-07-21 NOTE — HISTORY OF PRESENT ILLNESS
[FreeTextEntry1] : Mr. ALYSON QURESHI is a 60-year-old male who presents today for followup. Previously, he has been followed for right side lung cancer s/p SBRT, wedge resection of KIMBERLY lesion fungal abscess, right side empyema with Eloesser flap, osteomyelitis of right right, chronic rib pain with chronic opioid dependence, COPD, emphysema with bullous upper lobe disease and a bronchopleural fistula who underwent endobronchial valve placement to RML, removal of 6th rib and Eloesser flap revision for persistent air leak and right empyema on 4/18/2023. He presents today for clinical follow up.   He continues to be under Palliative Care and receiving Dilaudid PCA for his ongoing pain.  He is being followed by Pulmonology. Dr. Otoole.   11/25/24 CT Chest non contrast through [facility] -Indeterminate left apical 0.6 x 1 cm nodular opacity is unchanged compared to 8/23/2024 CT chest but slightly progressed compared to 10/14/2023. -Indeterminate left lower lobe 1 cm nodular consolidative opacity is unchanged compared to 8/23/2024 but appears more dense compared to 10/14/2023.  Today he reports having recently been admitted for Influenza A with sepsis and hypoxemia. He was discharged 1/1/2025. He currently feels  aosft 7/10 pain in the chest. He feels beaten from the flu. He is feeling a little better